# Patient Record
Sex: MALE | Race: WHITE | NOT HISPANIC OR LATINO
[De-identification: names, ages, dates, MRNs, and addresses within clinical notes are randomized per-mention and may not be internally consistent; named-entity substitution may affect disease eponyms.]

---

## 2019-06-27 PROBLEM — Z00.00 ENCOUNTER FOR PREVENTIVE HEALTH EXAMINATION: Status: ACTIVE | Noted: 2019-06-27

## 2019-07-01 ENCOUNTER — APPOINTMENT (OUTPATIENT)
Dept: OTOLARYNGOLOGY | Facility: CLINIC | Age: 54
End: 2019-07-01
Payer: COMMERCIAL

## 2019-07-01 VITALS
SYSTOLIC BLOOD PRESSURE: 107 MMHG | HEART RATE: 61 BPM | WEIGHT: 140 LBS | BODY MASS INDEX: 20.04 KG/M2 | DIASTOLIC BLOOD PRESSURE: 62 MMHG | HEIGHT: 70 IN

## 2019-07-01 DIAGNOSIS — Z87.898 PERSONAL HISTORY OF OTHER SPECIFIED CONDITIONS: ICD-10-CM

## 2019-07-01 DIAGNOSIS — Z86.39 PERSONAL HISTORY OF OTHER ENDOCRINE, NUTRITIONAL AND METABOLIC DISEASE: ICD-10-CM

## 2019-07-01 DIAGNOSIS — Z87.19 PERSONAL HISTORY OF OTHER DISEASES OF THE DIGESTIVE SYSTEM: ICD-10-CM

## 2019-07-01 DIAGNOSIS — H90.3 SENSORINEURAL HEARING LOSS, BILATERAL: ICD-10-CM

## 2019-07-01 DIAGNOSIS — H93.13 TINNITUS, BILATERAL: ICD-10-CM

## 2019-07-01 DIAGNOSIS — Z80.9 FAMILY HISTORY OF MALIGNANT NEOPLASM, UNSPECIFIED: ICD-10-CM

## 2019-07-01 DIAGNOSIS — H81.10 BENIGN PAROXYSMAL VERTIGO, UNSPECIFIED EAR: ICD-10-CM

## 2019-07-01 PROCEDURE — 92557 COMPREHENSIVE HEARING TEST: CPT

## 2019-07-01 PROCEDURE — 92567 TYMPANOMETRY: CPT

## 2019-07-01 PROCEDURE — 99213 OFFICE O/P EST LOW 20 MIN: CPT

## 2019-07-01 NOTE — HISTORY OF PRESENT ILLNESS
[de-identified] : DIAMOND MORA has a history of tinnitus and positional vertigo. [FreeTextEntry1] : 07/01/2019 patient reports intermittent mild tinnitus for about 2 weeks right greater than left. Had gone to [facility] and was told that he has acute Effusion in the right ear, it was recommended that he take Sudafed which didn't alleviate the symptoms. [facility] also recommend to Follow up with ENT. No pain reported. No Vertigo.

## 2019-07-01 NOTE — DATA REVIEWED
[de-identified] : Complete audiometry was ordered and completed today. This was separately reported by the audiologist. The results were reviewed in detail with the patient.\par \par

## 2019-07-01 NOTE — ASSESSMENT
[FreeTextEntry1] : I have carefully reviewed the etiologies of tinnitus with the patient and have reviewed management options including coping strategies.  I have offered a referral to an audiologist for further evaluation and counseling.\par \par Annual follow up for reevaluation and repeat IM which she recommended.

## 2019-07-23 PROBLEM — H90.3 SENSORINEURAL HEARING LOSS (SNHL) OF BOTH EARS: Status: ACTIVE | Noted: 2019-07-01

## 2021-11-28 ENCOUNTER — EMERGENCY (EMERGENCY)
Facility: HOSPITAL | Age: 56
LOS: 1 days | Discharge: HOME | End: 2021-11-28
Admitting: EMERGENCY MEDICINE
Payer: COMMERCIAL

## 2021-11-28 VITALS
HEART RATE: 83 BPM | OXYGEN SATURATION: 100 % | RESPIRATION RATE: 18 BRPM | SYSTOLIC BLOOD PRESSURE: 163 MMHG | WEIGHT: 145.06 LBS | TEMPERATURE: 97 F | HEIGHT: 70 IN | DIASTOLIC BLOOD PRESSURE: 89 MMHG

## 2021-11-28 DIAGNOSIS — T23.202A BURN OF SECOND DEGREE OF LEFT HAND, UNSPECIFIED SITE, INITIAL ENCOUNTER: ICD-10-CM

## 2021-11-28 DIAGNOSIS — T23.222A BURN OF SECOND DEGREE OF SINGLE LEFT FINGER (NAIL) EXCEPT THUMB, INITIAL ENCOUNTER: ICD-10-CM
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
## 2021-11-28 DIAGNOSIS — T31.0 BURNS INVOLVING LESS THAN 10% OF BODY SURFACE: ICD-10-CM
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
## 2021-11-28 PROCEDURE — 16020 DRESS/DEBRID P-THICK BURN S: CPT

## 2021-11-28 PROCEDURE — 99285 EMERGENCY DEPT VISIT HI MDM: CPT | Mod: 25

## 2021-11-28 PROCEDURE — 99284 EMERGENCY DEPT VISIT MOD MDM: CPT | Mod: 25

## 2021-11-28 RX ORDER — OXYCODONE HYDROCHLORIDE 5 MG/1
1 TABLET ORAL
Qty: 10 | Refills: 0
Start: 2021-11-28

## 2021-11-28 RX ORDER — OXYCODONE AND ACETAMINOPHEN 5; 325 MG/1; MG/1
1 TABLET ORAL ONCE
Refills: 0 | Status: DISCONTINUED | OUTPATIENT
Start: 2021-11-28 | End: 2021-11-28

## 2021-11-28 RX ADMIN — OXYCODONE AND ACETAMINOPHEN 1 TABLET(S): 5; 325 TABLET ORAL at 19:35

## 2021-11-28 RX ADMIN — Medication 1 APPLICATION(S): at 19:26

## 2021-11-28 NOTE — ED PROVIDER NOTE - NSFOLLOWUPINSTRUCTIONS_ED_ALL_ED_FT
Second-Degree Burn    WHAT YOU NEED TO KNOW:    A second-degree burn is also called a partial-thickness burn. A second-degree burn occurs when the first layer and some of the second layer of skin are burned. A superficial second-degree burn usually heals within 2 to 3 weeks with some scarring. A deep second-degree burn can take longer to heal. A second-degree burn can also get worse after a few days and become a third-degree burn.    DISCHARGE INSTRUCTIONS:    Return to the emergency department if:   •You have a fast heartbeat or breathing.      •You are not urinating.      Call your doctor or burn specialist if:   •You have a fever.      •You have increased redness, numbness, or swelling in the burn area.      •Your wound or bandage is leaking pus and has a bad smell.      •Your pain does not get better, or gets worse, even after you take pain medicine.      •You have a dry mouth or eyes.      •You are overly thirsty or tired.      •You have dark yellow urine or urinate less than usual.      •You have a headache or feel dizzy.      •You have questions or concerns about your condition or care.      Medicines:   •Medicines may be given to decrease pain, prevent infection, or help your burn heal. They may be given as a pill or as an ointment applied to your skin.      •Take your medicine as directed. Contact your healthcare provider if you think your medicine is not helping or if you have side effects. Tell him or her if you are allergic to any medicine. Keep a list of the medicines, vitamins, and herbs you take. Include the amounts, and when and why you take them. Bring the list or the pill bottles to follow-up visits. Carry your medicine list with you in case of an emergency.      Burn care:   •Wash your hands with soap and water. Dry your hands with a clean towel or paper towel.  Handwashing           •Remove old bandages. You may need to soak the bandage in water before you remove it so it will not stick to your wound.      •Gently clean the burned area daily with mild soap and water. Pat the area dry. Look for any swelling or redness around the burn. Do not break closed blisters. You may cause a skin infection.      •Apply cream or ointment to the burn with a cotton swab. Place a nonstick bandage over your burn.      •Wrap a layer of gauze around the bandage to hold it in place. The wrap should be snug but not tight. It is too tight if you feel tingling or lose feeling in that area.      •Apply gentle pressure for a few minutesif bleeding occurs.      •Elevate your burned arm or leg above the level of your heart as often as you can. This will help decrease swelling and pain. Prop your burned arm or leg on pillows or blankets to keep it elevated comfortably.             Self-care:   •Drink liquids as directed. You may need to drink extra liquid to help prevent dehydration. Ask how much liquid to drink each day and which liquids are best for you.      •Go to physical therapy, if directed. Your muscles and joints may not work well after a second-degree burn. A physical therapist teaches you exercises to help improve movement and strength, and to decrease pain.      Prevent second-degree burns:   •Do not leave cups, mugs, or bowls containing hot liquids at the edge of a table. Keep pot handles turned away from the stove front.      •Do not leave a lit cigarette. Make sure it is no longer lit. Then dispose of it safely.      •Store dangerous items out of the reach of children. Store cigarette lighters, matches, and chemicals where children cannot reach them. Use child safety latches on the door of the safe storage area.  Common Childproofing Latches            •Keep your water heater setting to low or medium (90°F to 120°F, or 32°C to 48°C).      •Wear sunscreen that has a sun protectant factor (SPF) of 15 or higher. The sunscreen should also have ultraviolet A (UVA) and ultraviolet B (UVB) protection. Follow the directions on the label when you use sunscreen. Put on more sunscreen if you are in the sun for more than an hour. Reapply sunscreen often if you go swimming or are sweating.      Follow up with your doctor or burn specialist as directed: You may need to return to have your wound checked and your bandage changed. Write down your questions so you remember to ask them during your visits.

## 2021-11-28 NOTE — ED ADULT NURSE NOTE - IN THE PAST 12 MONTHS HAVE YOU USED DRUGS OTHER THAN THOSE REQUIRED FOR MEDICAL REASON?
(3rd attempt) Left a voice msg for pt to call back.  When he does schedule BP recheck with RN.  Ladi Alvarado CMA ([facility])   (aka: Suly Alvarado)   No
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
## 2021-11-28 NOTE — ED PROVIDER NOTE - PATIENT PORTAL LINK FT
You can access the FollowMyHealth Patient Portal offered by [facility] by registering at the following website: [email or web address]. By joining [employer]’s FollowMyHealth portal, you will also be able to view your health information using other applications (apps) compatible with our system.
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
## 2021-11-28 NOTE — ED PROVIDER NOTE - CONSTITUTIONAL, MLM
normal... Well appearing, awake, alert, oriented to person, place, time/situation and mild distress.
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
## 2023-10-10 NOTE — ED ADULT NURSE NOTE - NS ED NURSE DISCH DISPOSITION
Discharged Manual Repair Warning Statement: We plan on removing the manually selected variable below in favor of our much easier automatic structured text blocks found in the previous tab. We decided to do this to help make the flow better and give you the full power of structured data. Manual selection is never going to be ideal in our platform and I would encourage you to avoid using manual selection from this point on, especially since I will be sunsetting this feature. It is important that you do one of two things with the customized text below. First, you can save all of the text in a word file so you can have it for future reference. Second, transfer the text to the appropriate area in the Library tab. Lastly, if there is a flap or graft type which we do not have you need to let us know right away so I can add it in before the variable is hidden. No need to panic, we plan to give you roughly 6 months to make the change.

## 2023-12-01 ENCOUNTER — NON-APPOINTMENT (OUTPATIENT)
Age: 58
End: 2023-12-01